# Patient Record
Sex: MALE | Race: WHITE
[De-identification: names, ages, dates, MRNs, and addresses within clinical notes are randomized per-mention and may not be internally consistent; named-entity substitution may affect disease eponyms.]

---

## 2019-02-12 ENCOUNTER — HOSPITAL ENCOUNTER (OUTPATIENT)
Dept: HOSPITAL 75 - CATH | Age: 54
LOS: 1 days | Discharge: HOME | End: 2019-02-13
Attending: INTERNAL MEDICINE
Payer: COMMERCIAL

## 2019-02-12 VITALS — DIASTOLIC BLOOD PRESSURE: 41 MMHG | SYSTOLIC BLOOD PRESSURE: 122 MMHG

## 2019-02-12 VITALS — SYSTOLIC BLOOD PRESSURE: 96 MMHG | DIASTOLIC BLOOD PRESSURE: 50 MMHG

## 2019-02-12 VITALS — SYSTOLIC BLOOD PRESSURE: 113 MMHG | DIASTOLIC BLOOD PRESSURE: 48 MMHG

## 2019-02-12 VITALS — WEIGHT: 175 LBS | HEIGHT: 67 IN | BODY MASS INDEX: 27.47 KG/M2

## 2019-02-12 VITALS — SYSTOLIC BLOOD PRESSURE: 116 MMHG | DIASTOLIC BLOOD PRESSURE: 50 MMHG

## 2019-02-12 VITALS — SYSTOLIC BLOOD PRESSURE: 97 MMHG | DIASTOLIC BLOOD PRESSURE: 47 MMHG

## 2019-02-12 VITALS — DIASTOLIC BLOOD PRESSURE: 50 MMHG | SYSTOLIC BLOOD PRESSURE: 100 MMHG

## 2019-02-12 VITALS — SYSTOLIC BLOOD PRESSURE: 105 MMHG | DIASTOLIC BLOOD PRESSURE: 63 MMHG

## 2019-02-12 VITALS — DIASTOLIC BLOOD PRESSURE: 52 MMHG | SYSTOLIC BLOOD PRESSURE: 106 MMHG

## 2019-02-12 DIAGNOSIS — K21.9: ICD-10-CM

## 2019-02-12 DIAGNOSIS — I25.10: Primary | ICD-10-CM

## 2019-02-12 DIAGNOSIS — Z79.82: ICD-10-CM

## 2019-02-12 DIAGNOSIS — E78.5: ICD-10-CM

## 2019-02-12 DIAGNOSIS — Z79.899: ICD-10-CM

## 2019-02-12 DIAGNOSIS — F17.210: ICD-10-CM

## 2019-02-12 DIAGNOSIS — Z82.49: ICD-10-CM

## 2019-02-12 DIAGNOSIS — I10: ICD-10-CM

## 2019-02-12 LAB
ALBUMIN SERPL-MCNC: 4.7 GM/DL (ref 3.2–4.5)
ALP SERPL-CCNC: 83 U/L (ref 40–136)
ALT SERPL-CCNC: 51 U/L (ref 0–55)
APTT BLD: 31 SEC (ref 24–35)
APTT PPP: YELLOW S
BACTERIA #/AREA URNS HPF: (no result) /HPF
BILIRUB SERPL-MCNC: 0.4 MG/DL (ref 0.1–1)
BILIRUB UR QL STRIP: NEGATIVE
BUN/CREAT SERPL: 22
CALCIUM SERPL-MCNC: 9.8 MG/DL (ref 8.5–10.1)
CHLORIDE SERPL-SCNC: 99 MMOL/L (ref 98–107)
CO2 SERPL-SCNC: 26 MMOL/L (ref 21–32)
CREAT SERPL-MCNC: 1.42 MG/DL (ref 0.6–1.3)
ERYTHROCYTE [DISTWIDTH] IN BLOOD BY AUTOMATED COUNT: 13.9 % (ref 10–14.5)
FIBRINOGEN PPP-MCNC: CLEAR MG/DL
GFR SERPLBLD BASED ON 1.73 SQ M-ARVRAT: 52 ML/MIN
GLUCOSE SERPL-MCNC: 87 MG/DL (ref 70–105)
GLUCOSE UR STRIP-MCNC: NEGATIVE MG/DL
HCT VFR BLD CALC: 43 % (ref 40–54)
HGB BLD-MCNC: 14.4 G/DL (ref 13.3–17.7)
HYALINE CASTS #/AREA URNS LPF: (no result) /LPF
INR PPP: 0.9 (ref 0.8–1.4)
KETONES UR QL STRIP: NEGATIVE
LEUKOCYTE ESTERASE UR QL STRIP: NEGATIVE
MCH RBC QN AUTO: 32 PG (ref 25–34)
MCHC RBC AUTO-ENTMCNC: 34 G/DL (ref 32–36)
MCV RBC AUTO: 94 FL (ref 80–99)
NITRITE UR QL STRIP: NEGATIVE
PH UR STRIP: 5 [PH] (ref 5–9)
PLATELET # BLD: 209 10^3/UL (ref 130–400)
PMV BLD AUTO: 10.5 FL (ref 7.4–10.4)
POTASSIUM SERPL-SCNC: 4.2 MMOL/L (ref 3.6–5)
PROT SERPL-MCNC: 7.3 GM/DL (ref 6.4–8.2)
PROT UR QL STRIP: NEGATIVE
PROTHROMBIN TIME: 12.1 SEC (ref 12.2–14.7)
RBC #/AREA URNS HPF: (no result) /HPF
SODIUM SERPL-SCNC: 137 MMOL/L (ref 135–145)
SP GR UR STRIP: 1.02 (ref 1.02–1.02)
SQUAMOUS #/AREA URNS HPF: (no result) /HPF
UROBILINOGEN UR-MCNC: NORMAL MG/DL
WBC # BLD AUTO: 10.8 10^3/UL (ref 4.3–11)
WBC #/AREA URNS HPF: (no result) /HPF

## 2019-02-12 PROCEDURE — 84100 ASSAY OF PHOSPHORUS: CPT

## 2019-02-12 PROCEDURE — 80053 COMPREHEN METABOLIC PANEL: CPT

## 2019-02-12 PROCEDURE — 93458 L HRT ARTERY/VENTRICLE ANGIO: CPT

## 2019-02-12 PROCEDURE — 87081 CULTURE SCREEN ONLY: CPT

## 2019-02-12 PROCEDURE — 81000 URINALYSIS NONAUTO W/SCOPE: CPT

## 2019-02-12 PROCEDURE — 71045 X-RAY EXAM CHEST 1 VIEW: CPT

## 2019-02-12 PROCEDURE — 85027 COMPLETE CBC AUTOMATED: CPT

## 2019-02-12 PROCEDURE — 85025 COMPLETE CBC W/AUTO DIFF WBC: CPT

## 2019-02-12 PROCEDURE — 36415 COLL VENOUS BLD VENIPUNCTURE: CPT

## 2019-02-12 PROCEDURE — 83735 ASSAY OF MAGNESIUM: CPT

## 2019-02-12 PROCEDURE — 85730 THROMBOPLASTIN TIME PARTIAL: CPT

## 2019-02-12 PROCEDURE — 80048 BASIC METABOLIC PNL TOTAL CA: CPT

## 2019-02-12 PROCEDURE — 85610 PROTHROMBIN TIME: CPT

## 2019-02-12 PROCEDURE — 90686 IIV4 VACC NO PRSV 0.5 ML IM: CPT

## 2019-02-12 RX ADMIN — SODIUM CHLORIDE SCH MLS/HR: 900 INJECTION, SOLUTION INTRAVENOUS at 13:05

## 2019-02-12 RX ADMIN — SODIUM CHLORIDE SCH MLS/HR: 900 INJECTION, SOLUTION INTRAVENOUS at 18:08

## 2019-02-12 NOTE — CARDIAC CATH REPORT
Cardiac Cath Report


Physician (s)/Assistant (s)


Physician


CHARLES LOUIS MD





Pre-Procedure Diagnosis


Pre-Procedure Diagnosis:  Chest pain





Post-Procedure Note


Procedure Start Date:  Feb 12, 2019


Name of Procedure:  


Left heart catheterization


Findings/Procedure Note


PROCEDURE NOTE:


53 years old gentleman with strong family history of heart disease, multiple 

risk factors, having symptoms suggesting of unstable angina, he was scheduled 

for cardiac catheterization possible PTCA





After explaining the procedure to the patient, all pros and cons were explained

, all questions were answered.  The patient signed the consent and then he  was 

placed on the cardiac catheterization laboratory. Groin was prepped SL fashion 

local anesthesia was used. Sheath placed in the artery. Eligio right and left 

catheter were used to access the coronary system, I had difficulty accessing 

the right coronary artery, had significant dampening of pressure with spasm.  I 

was suspicious of a lesion in the proximal portion of the right coronary artery 

subsequently I gave the patient 4000 units of heparin and tried short tip JR 

guide without definite ST to engage the right coronary artery then used regular 

JR guide with side holes.  I was able to engage the right coronary artery and 

angiogram showed resolution of the severe lesion in the proximal right coronary 

artery indicating that it was mainly coronary spasm.





Patient had a pigtail catheter placed in the left ventricular cavity, pressure 

was measured no left ventriculogram was done due to the underlying renal 

insufficiency.


At the end of the procedure the sheath was sutured in place to the fact the 

patient received heparin





FINDINGS:





Hemodynamics 


/11, end-diastolic pressure of 11


Aorta 96/48 mean of 70





ANATOMY:


Left Main is free of obstructive disease


Left Anterior Descending is small with mild disease nonobstructive disease


Left Circumflex has mild disease nonobstructive disease


Right Coronory Artery significant spasm in the proximal portion of the right 

coronary artery, nonobstructive disease


LV Gram was not done, pressure was measured





CONCLUSION:


1.  Mild coronary artery disease, significant spasm in the right coronary artery

, nonobstructive disease


2.  Normal left ventricular end-diastolic pressure





DISCUSSION AND RECOMMENDATION:


no intervention is recommended, conservative management


Anesthesia Type:  Conscious Sedation


Estimated blood loss (mL):  25 ml


Contrast Amount:  67 ml


Total Radiation Dose:  675 mGy





Post-Procedure Diagnosis


Post-operative diagnosis:  


Chest pain


Coronary artery disease


Family history of heart disease


Tobaccoism











CHARLES LOUIS MD Feb 12, 2019 15:36

## 2019-02-12 NOTE — XMS REPORT
[facility]

 Created on: 2018



Hussein Issa

External Reference #: [id number]

: 1965

Sex: Male



Demographics







 Address  [address] [place] [address]  [address]

 

 Preferred Language  Unknown

 

 Marital Status  Unknown

 

 Anabaptist Affiliation  Unknown

 

 Race  Unknown

 

 Ethnic Group  Unknown





Author







 Author  GOPI HANNON

 

 Organization  [facility]

 

 Address  [address] [place]  [address]



 

 Phone  [phone]







Care Team Providers







 Care Team Member Name  Role  Phone

 

 GOPI HANNON  Unavailable  [phone]







PROBLEMS







 Type  Condition  ICD9-CM Code  TIG93-NM Code  Onset Dates  Condition Status  
SNOMED Code

 

 Problem  Primary osteoarthritis, unspecified site     M19.91     Active  
[id number]

 

 Problem  Neuropathy     G62.9     Active  605098102

 

 Problem  Dyslipidemia     E78.5     Active  135789262

 

 Problem  Primary hypertension     I10     Active  82123999







ALLERGIES

No Known Allergies



ENCOUNTERS







 Encounter  Location  Date  Diagnosis

 

 [facility]  [address] [facility] [id number] [address]  21 Aug, 2018  Primary hypertension I10 ; Dyslipidemia E78.5 ; Neuropathy 
G62.9 and Primary osteoarthritis, unspecified site M19.91







IMMUNIZATIONS

No Known Immunizations



SOCIAL HISTORY

Never Assessed



REASON FOR VISIT

Establish Care, PT reports no concerns just here to get established to get his 
medication refilled. PT notes he would like a consult in the future for a 
neurologist due to his neuropathy -Concepcion HICKMAN 



PLAN OF CARE







 Activity  Details









  









 Follow Up  3 months or as indicated by lab Reason:







VITAL SIGNS







 Height  67 in  2018

 

 Weight  165.2 lbs  2018

 

 Temperature  98.2 degrees Fahrenheit  2018

 

 Heart Rate  66 bpm  2018

 

 Respiratory Rate  20   2018

 

 Oximetry  96 %  2018

 

 BMI  25.87 kg/m2  2018

 

 Blood pressure systolic  128 mmHg  2018

 

 Blood pressure diastolic  68 mmHg  2018







MEDICATIONS







 Medication  Instructions  Dosage  Frequency  Start Date  End Date  Duration  
Status

 

 Diclofenac Sodium 75 MG                    Active

 

 Carisoprodol 350 MG  Orally 2 times a day  1 tablet as needed  12h     18 May, 
2019  90 days  Active

 

 Lisinopril-Hydrochlorothiazide 20-25 MG                    Active

 

 Pravastatin Sodium 20 MG     1 tablet              Active

 

 Amitriptyline HCl 100 mg  Orally Once a day  1 tablet  24h        90 days  
Active







RESULTS

No Results



PROCEDURES

No Known procedures



INSTRUCTIONS





MEDICATIONS ADMINISTERED

No Known Medications



MEDICAL (GENERAL) HISTORY







 Type  Description  Date

 

 Medical History  Hypertension   

 

 Medical History  High Cholesteral   

 

 Medical History  RSD- Reflex sympathetic dystrophy   

 

 Medical History  Arthritis (neck to feet)   

 

 Medical History  Idiopathic progressive neuropathy   

 

 Medical History  Carpal tunnel in both hands   

 

 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   

 

 Surgical History  Appendix removal  

 

 Surgical History  Tonsils removal  

 

 Surgical History  hemorrhoid surgery  

 

 Hospitalization History  Surgeries

## 2019-02-12 NOTE — DIAGNOSTIC IMAGING REPORT
PATIENT HISTORY: Chest pain, shortness of breath, hypertension,

and tobacco use.



TECHNIQUE: Single frontal view of the chest.



COMPARISON:  None.



FINDINGS: The lung volumes are normal. No focal consolidation is

seen. No large pleural effusion or pneumothorax is seen. The

cardiomediastinal silhouette is normal in size and contour. No

acute osseous abnormality is seen.



IMPRESSION:

No acute pulmonary abnormality seen.



Dictated by: 



  Dictated on workstation # [id number]

## 2019-02-12 NOTE — CARDIAC PROCEDURE NOTE-CS/ASA
Pre-Procedure Note


Pre-Op Procedure Note


H&P Reviewed


The H&P was reviewed, patient examined and no changes noted.


Date H&P Reviewed:  Feb 12, 2019


Time H&P Reviewed:  15:00





Conscious Sedation Pre-Proced


Time


15:00





ASA Score


3


For ASA 3 and 4: Consider anesthesia and medical clearance. Also, for 

patients with a history of failed moderate sedation consider anesthesia.

















Airway 


 


Lungs 


 


Heart 


 


 ASA score


 


 ASA 1: a normal healthy patient


 


 ASA 2:  a patient with a mild systemic disease (mid diabetes, controlled 

hypertension, obesity 


 


x ASA 3:  a patient with a severe systemic disease that limits activity  (angina

, COPD, prior Myocardial infarction)


 


 ASA 4:  a patient with an incapacitating disease that is a constant threat to 

life (CHF, renal failure)


 


 ASA 5:  a moribund patient not expected to survive 24 hrs.  (ruptured aneurysm)


 


 ASA 6:  a declared brain-dead patient whose organs are being harvested.


 


 For emergent operations, add the letter E after the classification











Mallampati Classification


Grade 3





Sedation Plan


Analgesia, Amnesia, Plan communicated to team members, Discussed options with 

patient/fam, Discussed risks with patient/fam


The patient is an appropriate candidate to undergo the planned procedure, 

sedation, and anesthesia.





The patient immediately re-assessed prior to indication.











CHARLES LOUIS MD Feb 12, 2019 15:00

## 2019-02-13 VITALS — SYSTOLIC BLOOD PRESSURE: 101 MMHG | DIASTOLIC BLOOD PRESSURE: 47 MMHG

## 2019-02-13 VITALS — DIASTOLIC BLOOD PRESSURE: 46 MMHG | SYSTOLIC BLOOD PRESSURE: 100 MMHG

## 2019-02-13 VITALS — DIASTOLIC BLOOD PRESSURE: 51 MMHG | SYSTOLIC BLOOD PRESSURE: 111 MMHG

## 2019-02-13 VITALS — SYSTOLIC BLOOD PRESSURE: 115 MMHG | DIASTOLIC BLOOD PRESSURE: 68 MMHG

## 2019-02-13 VITALS — DIASTOLIC BLOOD PRESSURE: 45 MMHG | SYSTOLIC BLOOD PRESSURE: 103 MMHG

## 2019-02-13 VITALS — DIASTOLIC BLOOD PRESSURE: 53 MMHG | SYSTOLIC BLOOD PRESSURE: 99 MMHG

## 2019-02-13 VITALS — SYSTOLIC BLOOD PRESSURE: 94 MMHG | DIASTOLIC BLOOD PRESSURE: 53 MMHG

## 2019-02-13 VITALS — DIASTOLIC BLOOD PRESSURE: 65 MMHG | SYSTOLIC BLOOD PRESSURE: 97 MMHG

## 2019-02-13 LAB
BASOPHILS # BLD AUTO: 0 10^3/UL (ref 0–0.1)
BASOPHILS NFR BLD AUTO: 0 % (ref 0–10)
BUN/CREAT SERPL: 21
CALCIUM SERPL-MCNC: 8.8 MG/DL (ref 8.5–10.1)
CHLORIDE SERPL-SCNC: 103 MMOL/L (ref 98–107)
CO2 SERPL-SCNC: 24 MMOL/L (ref 21–32)
CREAT SERPL-MCNC: 1.09 MG/DL (ref 0.6–1.3)
EOSINOPHIL # BLD AUTO: 0.3 10^3/UL (ref 0–0.3)
EOSINOPHIL NFR BLD AUTO: 3 % (ref 0–10)
ERYTHROCYTE [DISTWIDTH] IN BLOOD BY AUTOMATED COUNT: 14 % (ref 10–14.5)
GFR SERPLBLD BASED ON 1.73 SQ M-ARVRAT: > 60 ML/MIN
GLUCOSE SERPL-MCNC: 130 MG/DL (ref 70–105)
HCT VFR BLD CALC: 37 % (ref 40–54)
HGB BLD-MCNC: 12.6 G/DL (ref 13.3–17.7)
LYMPHOCYTES # BLD AUTO: 2.3 X 10^3 (ref 1–4)
LYMPHOCYTES NFR BLD AUTO: 23 % (ref 12–44)
MAGNESIUM SERPL-MCNC: 2 MG/DL (ref 1.8–2.4)
MANUAL DIFFERENTIAL PERFORMED BLD QL: NO
MCH RBC QN AUTO: 32 PG (ref 25–34)
MCHC RBC AUTO-ENTMCNC: 34 G/DL (ref 32–36)
MCV RBC AUTO: 95 FL (ref 80–99)
MONOCYTES # BLD AUTO: 0.9 X 10^3 (ref 0–1)
MONOCYTES NFR BLD AUTO: 9 % (ref 0–12)
NEUTROPHILS # BLD AUTO: 6.7 X 10^3 (ref 1.8–7.8)
NEUTROPHILS NFR BLD AUTO: 66 % (ref 42–75)
PHOSPHATE SERPL-MCNC: 2.4 MG/DL (ref 2.3–4.7)
PLATELET # BLD: 190 10^3/UL (ref 130–400)
PMV BLD AUTO: 10.5 FL (ref 7.4–10.4)
POTASSIUM SERPL-SCNC: 4 MMOL/L (ref 3.6–5)
SODIUM SERPL-SCNC: 136 MMOL/L (ref 135–145)
WBC # BLD AUTO: 10.1 10^3/UL (ref 4.3–11)

## 2019-02-13 RX ADMIN — SODIUM CHLORIDE SCH MLS/HR: 900 INJECTION, SOLUTION INTRAVENOUS at 08:55

## 2019-02-13 NOTE — DISCHARGE INST-POST CATH
Discharge Inst-CATH/EP


Post Cardiac Cath/EP D/C Inst


Follow Up/Plan


Appointment with Dr. Woods's office in 2-4 weeks


CARDIAC CATH DISCHARGE INSTRUCTIONS





*Hold Metformin for 48 hours post heart cath.





ACTIVITY





* Go Home directly and rest.


* Limit activity of the leg (or wrist if it was used) for 7 days including 

aerobics, swimming,


   jogging, bicycling, etc.


* Restrict stair-climbing for 7 days if possible, if not, climb up with your non

-cath leg, then


   bring together on the same step.


* Avoid lifting, pushing, pulling or excessive movement of the affected 

extremity for 7 days.


* Customary sexual activity may be resumed after 2 days-use caution not to use 

a position  


   that strains or causes pain to the affected extremity.


* No driving for 24 hours.


* NO SMOKING. 


* Avoid straining for bowel movements for 7 days.


* Gentle walking on level ground is allowed.


* Returning to work will depend on the type of procedure and the results. Your 

doctor will discuss


   this with you.





CALL YOUR DOCTOR FOR ANY OF THE FOLLOWING:





*If bleeding from the puncture site occurs- Apply gentle pressure to site with 

clean cloth and call


   your doctor or EMS.


* If a knot or lump forms under the skin, increases in size, or causes pain.


* If bruising appears to be worsening or moving further down your leg instead 

of disappearing.


* Temperature above 101 F.





CARE OF YOUR GROIN INCISION;





* Bruising or purple discoloration of the skin near the puncture site is common.


* You may shower only, no bathtub bathing for 5 days.  Be careful to avoid 

slipping as your


   leg may feel stiff.


* If a closure device was used on your femoral artery, please see the attached 

guide regarding


   care of the device and your leg.


* Leave the dressing on, until removed by office staff.





CARE OF YOUR WRIST INCISION;





* Bruising or purple discoloration of the skin near the puncture site is common.


* You may shower.


* DO NOT submerge wrist.


* Leave dressing on, until removed by office staff..











CHARLES WOODS MD Feb 13, 2019 08:42

## 2019-02-13 NOTE — NUR
PATIENT DISCHARGED, LEFT FACILITY AMBULATORY WITH NO COMPLICATIONS.  RT GROIN HAD NO 
SWELLING OR DRAINAGE, SOME BRUISING

PERSONAL BELONGINGS SENT WITH WIFE.  PATIENT AND WIFE VERBALIZED UNDERSTANDING OF ALL 
DISCHARGE INSTRUCTIONS, PATIENT HAS A FU APPT IN MARCH SCHEDULED WITH DR LOUIS

## 2021-05-19 ENCOUNTER — HOSPITAL ENCOUNTER (OUTPATIENT)
Dept: HOSPITAL 75 - CARD | Age: 56
End: 2021-05-19
Attending: PHYSICIAN ASSISTANT
Payer: COMMERCIAL

## 2021-05-19 VITALS — DIASTOLIC BLOOD PRESSURE: 66 MMHG | SYSTOLIC BLOOD PRESSURE: 133 MMHG

## 2021-05-19 DIAGNOSIS — R07.9: Primary | ICD-10-CM

## 2021-05-19 DIAGNOSIS — I10: ICD-10-CM

## 2021-05-19 PROCEDURE — 93351 STRESS TTE COMPLETE: CPT

## 2021-05-19 PROCEDURE — 93306 TTE W/DOPPLER COMPLETE: CPT

## 2023-08-07 ENCOUNTER — HOSPITAL ENCOUNTER (OUTPATIENT)
Dept: HOSPITAL 75 - RAD | Age: 58
End: 2023-08-07
Attending: PHYSICIAN ASSISTANT
Payer: COMMERCIAL

## 2023-08-07 DIAGNOSIS — I65.23: Primary | ICD-10-CM

## 2023-08-07 LAB
BUN/CREAT SERPL: 11
CREAT SERPL-MCNC: 1.13 MG/DL (ref 0.6–1.3)
GFR SERPLBLD BASED ON 1.73 SQ M-ARVRAT: 74 ML/MIN

## 2023-08-07 PROCEDURE — 82565 ASSAY OF CREATININE: CPT

## 2023-08-07 PROCEDURE — 70496 CT ANGIOGRAPHY HEAD: CPT

## 2023-08-07 PROCEDURE — 84520 ASSAY OF UREA NITROGEN: CPT

## 2023-08-07 PROCEDURE — 70498 CT ANGIOGRAPHY NECK: CPT

## 2023-08-07 PROCEDURE — 36415 COLL VENOUS BLD VENIPUNCTURE: CPT

## 2023-08-07 NOTE — DIAGNOSTIC IMAGING REPORT
PROCEDURE: CT angiography of the head and CT angiography of the

neck with and without contrast.



TECHNIQUE: Contiguous noncontrast images were obtained from the

skull base through the vertex. After intravenous contrast

administration, helical CT angiography of the neck was performed.

Source data was reformatted into 3D MIP projections. Delayed post

contrast acquisition was also obtained. Auto Exposure Controls

were utilized during the CT exam to meet ALARA standards for

radiation dose reduction. 



INDICATION: Headache and dizziness with hardening of arteries.



CT head with and without contrast:



Ventricles and sulci are within normal limits for size. There is

encephalomalacia within the junction of right middle and

posterior cerebral artery territory likely related to old

infarct. There is no evidence of hemorrhage. There is no abnormal

mass effect or shift of midline structures. Focal low density is

also present within the subcortical right frontal white matter

near the vertex. Calvarium is intact and the visualized paranasal

sinuses are clear.



IMPRESSION: Multiple areas of low density in the right hemisphere

may represent watershed infarcts of a chronic nature. No acute

intracranial abnormality is seen.



CTA HEAD:



Anterior and middle cerebral arteries are patent bilaterally

without evidence of filling defect, stenosis or occlusion. Left

posterior cerebral arteries widely patent although there is

truncation of the right posterior cerebral artery P1 segment

compatible with occlusion. There is atherosclerotic disease at

the cavernous portions of both internal carotid arteries. The

basilar artery is patent.



IMPRESSION: Apparent occlusion of proximal right P1 segment.

Chronicity of this finding is not certain and clinical

correlation and MR imaging is recommended for assessment.



CTA NECK:



There is a three-vessel branching pattern arising from the aortic

arch. Common carotid arteries are patent with moderate amount of

atherosclerotic disease at the carotid bulbs, bilaterally. This

results in approximately 50% stenosis at the origin of right

internal carotid artery with just less than 50% stenosis at the

origin of left internal carotid artery. Otherwise, the remainder

of internal carotid arteries are patent to the neck. Both

vertebral arteries are patent, diffusely without evidence of

stenosis or occlusion.



IMPRESSION: Atherosclerotic disease at the carotid bifurcations

results in mild left and approximately 50% right stenosis at the

origin of internal carotid arteries. Otherwise no high-grade

stenosis or occlusion is detected.



Dictated by: 



  Dictated on workstation # [id number]

## 2023-08-21 ENCOUNTER — HOSPITAL ENCOUNTER (OUTPATIENT)
Dept: HOSPITAL 75 - CARD | Age: 58
End: 2023-08-21
Attending: PHYSICIAN ASSISTANT
Payer: COMMERCIAL

## 2023-08-21 VITALS — DIASTOLIC BLOOD PRESSURE: 75 MMHG | SYSTOLIC BLOOD PRESSURE: 120 MMHG

## 2023-08-21 DIAGNOSIS — R07.9: Primary | ICD-10-CM

## 2023-08-21 PROCEDURE — 93017 CV STRESS TEST TRACING ONLY: CPT

## 2023-08-23 NOTE — CARDIOLOGY STRESS TEST REPORT
Stress Test Report


Date of Procedure/Referring:


Date of Procedure:  Aug 21, 2023


PCP


No,Local Physician


Admitting Physician


Admitting Physician:


 








Attending Physician:


Sandy Rodriguez





Baseline Heart Rate:


67





Baseline Blood Pressure:


Blood Pressure Systolic:  120


Blood Pressure Diastolic:  75





Baseline EKG:


Baseline EKG:  NSR





Summary/Conclusion:


Summary:


In summary, the patient started exercising with a baseline heart rate, blood


pressure and EKG mentioned above





Patient was able to exercise for a total of 5 minutes on Jas protocol,  METs 7


Maximum heart rate 141      


Maximum blood pressure 184/72


   


Stress EKG, Minimal nondiagnostic changes 


Recovery EKG   , Return to baseline 





Conclusion:


1.  Good exercise tolerance for a total of 5 minutes on Jas protocol, 7 METs, 

achieving 88 percent of maximum expected heart rate


2.  Minimal nondiagnostic EKG changes with exercise returned to baseline during 

recovery


3.  No arrhythmia was noted





Copy


Copies To 1:   [facility]/CHARLES CONROY MD              Aug 23, 2023 17:27